# Patient Record
Sex: FEMALE | Race: WHITE | NOT HISPANIC OR LATINO | URBAN - METROPOLITAN AREA
[De-identification: names, ages, dates, MRNs, and addresses within clinical notes are randomized per-mention and may not be internally consistent; named-entity substitution may affect disease eponyms.]

---

## 2018-06-20 ENCOUNTER — OFFICE VISIT CONVERTED (OUTPATIENT)
Dept: OTHER | Facility: HOSPITAL | Age: 48
End: 2018-06-20
Attending: NURSE PRACTITIONER

## 2018-06-20 ENCOUNTER — CONVERSION ENCOUNTER (OUTPATIENT)
Dept: OTHER | Facility: HOSPITAL | Age: 48
End: 2018-06-20

## 2018-10-30 ENCOUNTER — OFFICE VISIT CONVERTED (OUTPATIENT)
Dept: OTHER | Facility: HOSPITAL | Age: 48
End: 2018-10-30
Attending: NURSE PRACTITIONER

## 2019-04-22 ENCOUNTER — CONVERSION ENCOUNTER (OUTPATIENT)
Dept: OTHER | Facility: HOSPITAL | Age: 49
End: 2019-04-22

## 2019-04-22 ENCOUNTER — HOSPITAL ENCOUNTER (OUTPATIENT)
Dept: OTHER | Facility: HOSPITAL | Age: 49
Discharge: HOME | End: 2019-04-22
Attending: NURSE PRACTITIONER

## 2019-04-22 ENCOUNTER — OFFICE VISIT CONVERTED (OUTPATIENT)
Dept: OTHER | Facility: HOSPITAL | Age: 49
End: 2019-04-22
Attending: NURSE PRACTITIONER

## 2019-04-22 LAB
ALBUMIN SERPL-MCNC: 4 G/DL (ref 3.5–5)
ALBUMIN/GLOB SERPL: 1.4 {RATIO} (ref 1.4–2.6)
ALP SERPL-CCNC: 74 U/L (ref 42–98)
ALT SERPL-CCNC: 9 U/L (ref 10–40)
ANION GAP SERPL CALC-SCNC: 18 MMOL/L (ref 8–19)
AST SERPL-CCNC: 12 U/L (ref 15–50)
BASOPHILS # BLD AUTO: 0.09 10*3/UL (ref 0–0.2)
BASOPHILS NFR BLD AUTO: 1.3 % (ref 0–3)
BILIRUB SERPL-MCNC: 0.48 MG/DL (ref 0.2–1.3)
BUN SERPL-MCNC: 11 MG/DL (ref 5–25)
BUN/CREAT SERPL: 12 {RATIO} (ref 6–20)
CALCIUM SERPL-MCNC: 8.9 MG/DL (ref 8.7–10.4)
CHLORIDE SERPL-SCNC: 102 MMOL/L (ref 99–111)
CHOLEST SERPL-MCNC: 153 MG/DL (ref 107–200)
CHOLEST/HDLC SERPL: 4.5 {RATIO} (ref 3–6)
CONV ABS IMM GRAN: 0.02 10*3/UL (ref 0–0.2)
CONV CO2: 23 MMOL/L (ref 22–32)
CONV IMMATURE GRAN: 0.3 % (ref 0–1.8)
CONV TOTAL PROTEIN: 6.8 G/DL (ref 6.3–8.2)
CREAT UR-MCNC: 0.89 MG/DL (ref 0.5–0.9)
DEPRECATED RDW RBC AUTO: 47.2 FL (ref 36.4–46.3)
EOSINOPHIL # BLD AUTO: 0.3 10*3/UL (ref 0–0.7)
EOSINOPHIL # BLD AUTO: 4.2 % (ref 0–7)
ERYTHROCYTE [DISTWIDTH] IN BLOOD BY AUTOMATED COUNT: 13.3 % (ref 11.7–14.4)
GFR SERPLBLD BASED ON 1.73 SQ M-ARVRAT: >60 ML/MIN/{1.73_M2}
GLOBULIN UR ELPH-MCNC: 2.8 G/DL (ref 2–3.5)
GLUCOSE SERPL-MCNC: 102 MG/DL (ref 65–99)
HBA1C MFR BLD: 12.3 G/DL (ref 12–16)
HCT VFR BLD AUTO: 39.7 % (ref 37–47)
HDLC SERPL-MCNC: 34 MG/DL (ref 40–60)
LDLC SERPL CALC-MCNC: 83 MG/DL (ref 70–100)
LYMPHOCYTES # BLD AUTO: 2.3 10*3/UL (ref 1–5)
MCH RBC QN AUTO: 29.6 PG (ref 27–31)
MCHC RBC AUTO-ENTMCNC: 31 G/DL (ref 33–37)
MCV RBC AUTO: 95.4 FL (ref 81–99)
MONOCYTES # BLD AUTO: 0.55 10*3/UL (ref 0.2–1.2)
MONOCYTES NFR BLD AUTO: 7.7 % (ref 3–10)
NEUTROPHILS # BLD AUTO: 3.86 10*3/UL (ref 2–8)
NEUTROPHILS NFR BLD AUTO: 54.2 % (ref 30–85)
NRBC CBCN: 0 % (ref 0–0.7)
OSMOLALITY SERPL CALC.SUM OF ELEC: 286 MOSM/KG (ref 273–304)
PLATELET # BLD AUTO: 206 10*3/UL (ref 130–400)
PMV BLD AUTO: 13 FL (ref 9.4–12.3)
POTASSIUM SERPL-SCNC: 4.5 MMOL/L (ref 3.5–5.3)
RBC # BLD AUTO: 4.16 10*6/UL (ref 4.2–5.4)
SODIUM SERPL-SCNC: 138 MMOL/L (ref 135–147)
TRIGL SERPL-MCNC: 178 MG/DL (ref 40–150)
VALPROATE SERPL-MCNC: 54.5 UG/ML (ref 50–100)
VARIANT LYMPHS NFR BLD MANUAL: 32.3 % (ref 20–45)
VLDLC SERPL-MCNC: 36 MG/DL (ref 5–37)
WBC # BLD AUTO: 7.12 10*3/UL (ref 4.8–10.8)

## 2019-04-23 LAB — TSH SERPL-ACNC: 1.93 M[IU]/L (ref 0.27–4.2)

## 2019-06-03 ENCOUNTER — OFFICE VISIT CONVERTED (OUTPATIENT)
Dept: OTHER | Facility: HOSPITAL | Age: 49
End: 2019-06-03
Attending: NURSE PRACTITIONER

## 2019-06-03 ENCOUNTER — CONVERSION ENCOUNTER (OUTPATIENT)
Dept: OTHER | Facility: HOSPITAL | Age: 49
End: 2019-06-03

## 2020-01-20 ENCOUNTER — CONVERSION ENCOUNTER (OUTPATIENT)
Dept: OTHER | Facility: HOSPITAL | Age: 50
End: 2020-01-20

## 2020-01-20 ENCOUNTER — HOSPITAL ENCOUNTER (OUTPATIENT)
Dept: OTHER | Facility: HOSPITAL | Age: 50
Discharge: HOME | End: 2020-01-20
Attending: NURSE PRACTITIONER

## 2020-01-20 ENCOUNTER — OFFICE VISIT CONVERTED (OUTPATIENT)
Dept: OTHER | Facility: HOSPITAL | Age: 50
End: 2020-01-20
Attending: NURSE PRACTITIONER

## 2020-01-20 LAB
ALBUMIN SERPL-MCNC: 4 G/DL (ref 3.5–5)
ALBUMIN/GLOB SERPL: 1.3 {RATIO} (ref 1.4–2.6)
ALP SERPL-CCNC: 65 U/L (ref 42–98)
ALT SERPL-CCNC: 10 U/L (ref 10–40)
ANION GAP SERPL CALC-SCNC: 18 MMOL/L (ref 8–19)
AST SERPL-CCNC: 13 U/L (ref 15–50)
BASOPHILS # BLD AUTO: 0.07 10*3/UL (ref 0–0.2)
BASOPHILS NFR BLD AUTO: 0.8 % (ref 0–3)
BILIRUB SERPL-MCNC: 0.24 MG/DL (ref 0.2–1.3)
BUN SERPL-MCNC: 13 MG/DL (ref 5–25)
BUN/CREAT SERPL: 14 {RATIO} (ref 6–20)
CALCIUM SERPL-MCNC: 9.2 MG/DL (ref 8.7–10.4)
CHLORIDE SERPL-SCNC: 101 MMOL/L (ref 99–111)
CHOLEST SERPL-MCNC: 158 MG/DL (ref 107–200)
CHOLEST/HDLC SERPL: 5.6 {RATIO} (ref 3–6)
CONV ABS IMM GRAN: 0.02 10*3/UL (ref 0–0.2)
CONV CO2: 21 MMOL/L (ref 22–32)
CONV IMMATURE GRAN: 0.2 % (ref 0–1.8)
CONV TOTAL PROTEIN: 7 G/DL (ref 6.3–8.2)
CREAT UR-MCNC: 0.92 MG/DL (ref 0.5–0.9)
DEPRECATED RDW RBC AUTO: 43.3 FL (ref 36.4–46.3)
EOSINOPHIL # BLD AUTO: 0.21 10*3/UL (ref 0–0.7)
EOSINOPHIL # BLD AUTO: 2.4 % (ref 0–7)
ERYTHROCYTE [DISTWIDTH] IN BLOOD BY AUTOMATED COUNT: 13 % (ref 11.7–14.4)
GFR SERPLBLD BASED ON 1.73 SQ M-ARVRAT: >60 ML/MIN/{1.73_M2}
GLOBULIN UR ELPH-MCNC: 3 G/DL (ref 2–3.5)
GLUCOSE SERPL-MCNC: 110 MG/DL (ref 65–99)
HCT VFR BLD AUTO: 39.3 % (ref 37–47)
HDLC SERPL-MCNC: 28 MG/DL (ref 40–60)
HGB BLD-MCNC: 13 G/DL (ref 12–16)
LDLC SERPL CALC-MCNC: 83 MG/DL (ref 70–100)
LYMPHOCYTES # BLD AUTO: 2.92 10*3/UL (ref 1–5)
LYMPHOCYTES NFR BLD AUTO: 33.2 % (ref 20–45)
MCH RBC QN AUTO: 30 PG (ref 27–31)
MCHC RBC AUTO-ENTMCNC: 33.1 G/DL (ref 33–37)
MCV RBC AUTO: 90.8 FL (ref 81–99)
MONOCYTES # BLD AUTO: 0.59 10*3/UL (ref 0.2–1.2)
MONOCYTES NFR BLD AUTO: 6.7 % (ref 3–10)
NEUTROPHILS # BLD AUTO: 4.98 10*3/UL (ref 2–8)
NEUTROPHILS NFR BLD AUTO: 56.7 % (ref 30–85)
NRBC CBCN: 0 % (ref 0–0.7)
OSMOLALITY SERPL CALC.SUM OF ELEC: 283 MOSM/KG (ref 273–304)
PLATELET # BLD AUTO: 62 10*3/UL (ref 130–400)
PMV BLD AUTO: 12.3 FL (ref 9.4–12.3)
POTASSIUM SERPL-SCNC: 4.3 MMOL/L (ref 3.5–5.3)
RBC # BLD AUTO: 4.33 10*6/UL (ref 4.2–5.4)
SODIUM SERPL-SCNC: 136 MMOL/L (ref 135–147)
TRIGL SERPL-MCNC: 235 MG/DL (ref 40–150)
TSH SERPL-ACNC: 3.07 M[IU]/L (ref 0.27–4.2)
VALPROATE SERPL-MCNC: <2.8 UG/ML (ref 50–100)
VLDLC SERPL-MCNC: 47 MG/DL (ref 5–37)
WBC # BLD AUTO: 8.79 10*3/UL (ref 4.8–10.8)

## 2020-04-16 ENCOUNTER — TELEMEDICINE CONVERTED (OUTPATIENT)
Dept: OTHER | Facility: HOSPITAL | Age: 50
End: 2020-04-16
Attending: NURSE PRACTITIONER

## 2020-04-29 ENCOUNTER — TELEPHONE CONVERTED (OUTPATIENT)
Dept: OTHER | Facility: HOSPITAL | Age: 50
End: 2020-04-29
Attending: NURSE PRACTITIONER

## 2021-05-12 NOTE — PROGRESS NOTES
Quick Note      Patient Name: Edie Medrano   Patient ID: [id number]   Sex: Female   YOB: 1970    Primary Care Provider: Bell SALAS   Referring Provider: Bell SALAS    Visit Date: April 29, 2020    Provider: JOSUE Jimenez   Location: [facility]   Location Address: [address] [place] [address]  [id number]   Location Phone: [phone]          History Of Present Illness  TELEHEALTH TELEPHONE VISIT  Chief Complaint: Follow up   Edie Medrano is a 49 year old /White female who is presenting for evaluation via telehealth telephone visit. Verbal consent obtained before beginning visit.   Provider spent 12 minutes with the patient during telehealth visit.   The following staff were present during this visit: JOSUE Goldstein and Elvia Buckner CMA   Past Medical History/Overview of Patient Symptoms     *Patient consented to consult via telephone    Follow up on HTN, BP was low at the last telephone Visit and we stopped her Lisinopril at that time and informed her to take the Lisinopril on a PRN basis. Recent readings, 114/78, 125/75, 138/80, 150/80, 111/69, 121/78, 134/94, 104/66, 100/65, 165/81. Will take the Lisinopril when it is 140/90.     Complains of trouble sleeping, states muscle relaxer helps with arm drawing up but left leg is restless at night and she cannot sleep. States that she has been wearing a brace to help her sleep. States that her hand curls up. Patient admits that she does not have much feeling in her left hand and arm related to her stroke 2 years ago. Went to PT in the past and admits that it helped.      Complains of first period since December.    Time In: 1424  Time Out: 1436       Physical Examination  · Constitutional  o Appearance  o : no acute distress, well-nourished  · Head and Face  o Head  o :   § Inspection  § : atraumatic, normocephalic  · Eyes  o Eyes  o : extraocular movements intact, no scleral icterus, no  conjunctival injection  · Ears, Nose, Mouth and Throat  o Ears  o :   § External Ears  § : normal  o Nose  o :   § Intranasal Exam  § : nares patent  o Oral Cavity  o :   § Oral Mucosa  § : moist mucous membranes  · Respiratory  o Respiratory Effort  o : breathing comfortably, symmetric chest rise  o Auscultation of Lungs  o : clear to asculatation bilaterally, no wheezes, rales, or rhonchii  · Cardiovascular  o Heart  o :   § Auscultation of Heart  § : regular rate and rhythm, no murmurs, rubs, or gallops  o Peripheral Vascular System  o :   § Extremities  § : no edema  · Neurologic  o Mental Status Examination  o :   § Orientation  § : grossly oriented to person, place and time  o Gait and Station  o :   § Gait Screening  § : normal gait  · Psychiatric  o General  o : normal mood and affect          Assessment  · Essential hypertension     401.9/I10  · Left arm weakness     729.89/R29.898  · History of CVA (cerebrovascular accident)     V12.54/Z86.73    Problems Reconciled  Plan  · Orders  o Physician Telephone Evaluation, 11-20 minutes (29231) - - 04/29/2020  o PHYSICAL THERAPY CONSULTATION ([facility]) - 729.89/R29.898, V12.54/Z86.73 - 04/29/2020   [facility], patient prefers to start in about 2 weeks if possible   · Medications  o cyclobenzaprine 5 mg oral tablet   SIG: take 1 tablet (5 mg) by oral route 3 times per day for 30 days   DISP: (30) tablets with 2 refills  Discontinued on 04/29/2020     o Medications have been Reconciled  o Transition of Care or Provider Policy  · Instructions  o Plan Of Care: Patient back to physical therapy so that they can work with her left arm weakness. Continue to monitor her blood pressure and use the lisinopril on a as needed basis. Patient voiced understanding and all questions answered.  o Patient instructed to seek medical attention urgently for new or worsening symptoms.  o Patient is taking medications as prescribed and doing well.   o Call the office with any concerns or  questions.  · Disposition  o Follow Up in 3 months            Electronically Signed by: JOSUE Jimenez -Author on April 29, 2020 03:39:23 PM

## 2021-05-12 NOTE — PROGRESS NOTES
Quick Note      Patient Name: Edie Medrano   Patient ID: [id number]   Sex: Female   YOB: 1970    Primary Care Provider: Bell SALAS   Referring Provider: Bell SALAS    Visit Date: 2020    Provider: JOSUE Jimenez   Location: [facility]   Location Address: [address] [place] [address]  [id number]   Location Phone: [phone]          History Of Present Illness  TELEHEALTH TELEPHONE VISIT  Chief Complaint: BP concerns, fluctuating high and low for about a week   Edie Medrano is a 49 year old /White female who is presenting for evaluation via telehealth telephone visit. Verbal consent obtained before beginning visit.   Provider spent 5 minutes with the patient during telehealth visit.   The following staff were present during this visit: Birdie Walker CMA, Bell SALAS   Past Medical History/Overview of Patient Symptoms     Patient with complaints of fluctuating blood pressure.  States that her blood pressures been as low as 94/66 and as high as 144/106.  Trending on the lower side more so than the high.  States her blood pressure is low she does feel extremely fatigued and tired.  When her blood pressure gets elevated she admits to some chest pain and shortness of breath.  Currently she is simply on lisinopril 5 mg once daily for hypertension.    She listed some blood pressure readings for , 120/69, 103/65, 94/66, 106/62, 144/106.    Time In: 1337  Time Out: 1342           Assessment  · Fluctuating blood pressure     796.4/I99.8    Problems Reconciled  Plan  · Orders  o Physican Telephone evaluation, 5-10 min (16105) - - 2020  · Medications  o lisinopril 5 mg oral tablet   SI tablet QD PRN for Blood Pressure greater than 140/90   DISP: (30) Tablet with 5 refills  Adjusted on 2020     o cefdinir 300 mg oral capsule   SIG: take 1 capsule (300 mg) by oral route every 12 hours for 10 days   DISP: (20) capsules with 0  refills  Discontinued on 04/16/2020     · Instructions  o Plan Of Care: I will switch her lisinopril to a PRN basis, advised patient to only take lisinopril if her blood pressure is greater than 140/90.  o Chronic conditions reviewed and taken into consideration for today's treatment plan.  o Patient was educated/instructed on their diagnosis, treatment and medications prior to discharge from the clinic today.  o Call the office with any concerns or questions.  · Disposition  o Follow up in 2 weeks            Electronically Signed by: JOSUE Jimenez -Author on April 16, 2020 02:52:41 PM

## 2021-05-15 VITALS
RESPIRATION RATE: 16 BRPM | HEART RATE: 94 BPM | TEMPERATURE: 97.8 F | HEIGHT: 63 IN | OXYGEN SATURATION: 98 % | BODY MASS INDEX: 32.25 KG/M2 | WEIGHT: 182 LBS | SYSTOLIC BLOOD PRESSURE: 118 MMHG | DIASTOLIC BLOOD PRESSURE: 72 MMHG

## 2021-05-15 VITALS
SYSTOLIC BLOOD PRESSURE: 102 MMHG | BODY MASS INDEX: 31.18 KG/M2 | RESPIRATION RATE: 18 BRPM | HEIGHT: 63 IN | WEIGHT: 176 LBS | OXYGEN SATURATION: 97 % | DIASTOLIC BLOOD PRESSURE: 50 MMHG | HEART RATE: 107 BPM | TEMPERATURE: 97.8 F

## 2021-05-15 VITALS
OXYGEN SATURATION: 97 % | HEIGHT: 63 IN | DIASTOLIC BLOOD PRESSURE: 72 MMHG | BODY MASS INDEX: 32.49 KG/M2 | SYSTOLIC BLOOD PRESSURE: 120 MMHG | WEIGHT: 183.37 LBS | HEART RATE: 96 BPM | TEMPERATURE: 97 F | RESPIRATION RATE: 18 BRPM

## 2021-05-16 VITALS
DIASTOLIC BLOOD PRESSURE: 72 MMHG | RESPIRATION RATE: 16 BRPM | WEIGHT: 167 LBS | OXYGEN SATURATION: 98 % | HEIGHT: 63 IN | TEMPERATURE: 97.6 F | HEART RATE: 80 BPM | SYSTOLIC BLOOD PRESSURE: 120 MMHG | BODY MASS INDEX: 29.59 KG/M2

## 2021-05-16 VITALS
HEIGHT: 63 IN | BODY MASS INDEX: 29.95 KG/M2 | WEIGHT: 169 LBS | OXYGEN SATURATION: 96 % | TEMPERATURE: 97.9 F | HEART RATE: 107 BPM | DIASTOLIC BLOOD PRESSURE: 82 MMHG | RESPIRATION RATE: 18 BRPM | SYSTOLIC BLOOD PRESSURE: 120 MMHG